# Patient Record
Sex: MALE | Race: OTHER | HISPANIC OR LATINO | URBAN - METROPOLITAN AREA
[De-identification: names, ages, dates, MRNs, and addresses within clinical notes are randomized per-mention and may not be internally consistent; named-entity substitution may affect disease eponyms.]

---

## 2024-09-30 ENCOUNTER — EMERGENCY (EMERGENCY)
Facility: HOSPITAL | Age: 19
LOS: 1 days | Discharge: HOME | End: 2024-09-30
Attending: STUDENT IN AN ORGANIZED HEALTH CARE EDUCATION/TRAINING PROGRAM
Payer: SELF-PAY

## 2024-09-30 VITALS
TEMPERATURE: 98 F | RESPIRATION RATE: 16 BRPM | SYSTOLIC BLOOD PRESSURE: 117 MMHG | OXYGEN SATURATION: 100 % | HEIGHT: 67 IN | DIASTOLIC BLOOD PRESSURE: 67 MMHG | WEIGHT: 140.21 LBS | HEART RATE: 84 BPM

## 2024-09-30 VITALS
DIASTOLIC BLOOD PRESSURE: 62 MMHG | SYSTOLIC BLOOD PRESSURE: 118 MMHG | HEART RATE: 66 BPM | TEMPERATURE: 98 F | OXYGEN SATURATION: 97 % | RESPIRATION RATE: 16 BRPM

## 2024-09-30 PROCEDURE — 99284 EMERGENCY DEPT VISIT MOD MDM: CPT

## 2024-09-30 PROCEDURE — 99283 EMERGENCY DEPT VISIT LOW MDM: CPT

## 2024-09-30 RX ORDER — LIDOCAINE 50 MG/G
1 CREAM TOPICAL ONCE
Refills: 0 | Status: COMPLETED | OUTPATIENT
Start: 2024-09-30 | End: 2024-09-30

## 2024-09-30 RX ADMIN — Medication 750 MILLIGRAM(S): at 15:44

## 2024-09-30 RX ADMIN — Medication 800 MILLIGRAM(S): at 15:44

## 2024-09-30 RX ADMIN — LIDOCAINE 1 PATCH: 50 CREAM TOPICAL at 15:44
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
## 2024-09-30 NOTE — ED ADULT NURSE NOTE - NS ED NURSE LEVEL OF CONSCIOUSNESS AFFECT
See patient MyChart message about gram stain results    Dr.Yeo did call patient 4/14/23 and dicussed lab results and referred to rheumatology    Tanja Smith, ATC     Calm/Appropriate

## 2024-09-30 NOTE — ED PROVIDER NOTE - CARDIOVASCULAR NEGATIVE STATEMENT, MLM
Addended by: Mamie Luke on: 7/24/2023 03:50 PM     Modules accepted: Orders
no chest pain and no edema.

## 2024-09-30 NOTE — ED PROVIDER NOTE - THROAT, MLM
[facility]
uvula midline, no vesicles, no redness, and no oropharyngeal exudate.

## 2024-09-30 NOTE — ED PROVIDER NOTE - PROGRESS NOTE DETAILS
reevaluation patient feels a lot better.  Patient D/C in stable condition with Rx sent to patient pharmacy.  Patient will follow-up as discussed

## 2024-09-30 NOTE — ED PROVIDER NOTE - PATIENT PORTAL LINK FT
You can access the FollowMyHealth Patient Portal offered by [facility] by registering at the following website: [email or web address]. By joining [email or web address] FollowMyHealth portal, you will also be able to view your health information using other applications (apps) compatible with our system.
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
